# Patient Record
Sex: MALE
[De-identification: names, ages, dates, MRNs, and addresses within clinical notes are randomized per-mention and may not be internally consistent; named-entity substitution may affect disease eponyms.]

---

## 2023-12-19 ENCOUNTER — DASHBOARD ENCOUNTERS (OUTPATIENT)
Age: 58
End: 2023-12-19

## 2023-12-19 ENCOUNTER — LAB OUTSIDE AN ENCOUNTER (OUTPATIENT)
Dept: URBAN - METROPOLITAN AREA CLINIC 63 | Facility: CLINIC | Age: 58
End: 2023-12-19

## 2023-12-19 ENCOUNTER — OFFICE VISIT (OUTPATIENT)
Dept: URBAN - METROPOLITAN AREA CLINIC 63 | Facility: CLINIC | Age: 58
End: 2023-12-19
Payer: COMMERCIAL

## 2023-12-19 VITALS
WEIGHT: 182 LBS | TEMPERATURE: 97.9 F | HEART RATE: 59 BPM | SYSTOLIC BLOOD PRESSURE: 124 MMHG | HEIGHT: 72 IN | DIASTOLIC BLOOD PRESSURE: 78 MMHG | BODY MASS INDEX: 24.65 KG/M2 | OXYGEN SATURATION: 97 %

## 2023-12-19 DIAGNOSIS — Z12.11 SCREEN FOR COLON CANCER: ICD-10-CM

## 2023-12-19 DIAGNOSIS — K70.30 ALCOHOLIC CIRRHOSIS OF LIVER WITHOUT ASCITES: ICD-10-CM

## 2023-12-19 PROBLEM — 420054005: Status: ACTIVE | Noted: 2023-12-19

## 2023-12-19 PROCEDURE — 99204 OFFICE O/P NEW MOD 45 MIN: CPT | Performed by: PHYSICIAN ASSISTANT

## 2023-12-19 RX ORDER — ROSUVASTATIN CALCIUM 10 MG/1
TAKE ONE TABLET BY MOUTH ONE TIME DAILY AT BEDTIME TABLET, COATED ORAL
Qty: 30 UNSPECIFIED | Refills: 0 | Status: ACTIVE | COMMUNITY

## 2023-12-19 RX ORDER — ATENOLOL 50 MG/1
TAKE ONE TABLET BY MOUTH ONE TIME DAILY TABLET ORAL
Qty: 30 UNSPECIFIED | Refills: 0 | Status: ACTIVE | COMMUNITY

## 2023-12-19 NOTE — HPI-TODAY'S VISIT:
57 yo referred for management of cirrhosis. He had labs done through his PCP on 8/24/23 which demonstrated an elevation of his AST and ALT to 62 and48 respectively. ALP normal at 112. T bili 1.3. CBC was unremarkable except for low platelets at 124. He was sent for a fibroscan on 10/27/23 [facility] demonstrated severe steatosis (S3) and severe fibrosis (F3). He used to drink shot and mixed cocktails. Now drinking beer about 3 beers per day. He will usually drink more on the weekends but he does not quantify. He screened negative for HBV and HCV.   He has no family history of liver disease. His father had alcoholism.   He has no Gi complaints. Denies any pyrosis, dysphagia, odynophagia, nausea, vomiting, abdominal pain, constipation, diarrhea, melena, hematochezia.   He had a colonoscopy within the past 10 years to evaluate rectal bleeding. He states this was due to hemorrhoids.   He has no family history of GI malignancy.

## 2023-12-29 ENCOUNTER — OFFICE VISIT (OUTPATIENT)
Dept: URBAN - METROPOLITAN AREA SURGERY CENTER 4 | Facility: SURGERY CENTER | Age: 58
End: 2023-12-29
Payer: COMMERCIAL

## 2023-12-29 ENCOUNTER — CLAIMS CREATED FROM THE CLAIM WINDOW (OUTPATIENT)
Dept: URBAN - METROPOLITAN AREA CLINIC 4 | Facility: CLINIC | Age: 58
End: 2023-12-29
Payer: COMMERCIAL

## 2023-12-29 ENCOUNTER — TELEPHONE ENCOUNTER (OUTPATIENT)
Dept: URBAN - METROPOLITAN AREA CLINIC 63 | Facility: CLINIC | Age: 58
End: 2023-12-29

## 2023-12-29 DIAGNOSIS — K31.89 OTHER DISEASES OF STOMACH AND DUODENUM: ICD-10-CM

## 2023-12-29 DIAGNOSIS — K44.9 HIATAL HERNIA: ICD-10-CM

## 2023-12-29 DIAGNOSIS — K57.30 DIVERTICULA, COLON: ICD-10-CM

## 2023-12-29 DIAGNOSIS — K29.70 GASTRITIS WITHOUT BLEEDING, UNSPECIFIED CHRONICITY, UNSPECIFIED GASTRITIS TYPE: ICD-10-CM

## 2023-12-29 DIAGNOSIS — K64.0 FIRST DEGREE HEMORRHOIDS: ICD-10-CM

## 2023-12-29 DIAGNOSIS — K29.70 GASTRITIS: ICD-10-CM

## 2023-12-29 DIAGNOSIS — K22.70 BARRETT'S ESOPHAGUS WITHOUT DYSPLASIA: ICD-10-CM

## 2023-12-29 DIAGNOSIS — Z12.11 COLON CANCER SCREENING (HIGH RISK): ICD-10-CM

## 2023-12-29 DIAGNOSIS — K57.30 DIVERTICULOSIS OF LARGE INTESTINE WITHOUT PERFORATION OR ABSCESS WITHOUT BLEEDING: ICD-10-CM

## 2023-12-29 DIAGNOSIS — K22.89 OTHER SPECIFIED DISEASE OF ESOPHAGUS: ICD-10-CM

## 2023-12-29 DIAGNOSIS — Z12.11 ENCOUNTER FOR SCREENING FOR MALIGNANT NEOPLASM OF COLON: ICD-10-CM

## 2023-12-29 DIAGNOSIS — K44.9 DIAPHRAGMATIC HERNIA WITHOUT OBSTRUCTION OR GANGRENE: ICD-10-CM

## 2023-12-29 PROCEDURE — 88312 SPECIAL STAINS GROUP 1: CPT | Performed by: PATHOLOGY

## 2023-12-29 PROCEDURE — 43239 EGD BIOPSY SINGLE/MULTIPLE: CPT | Performed by: INTERNAL MEDICINE

## 2023-12-29 PROCEDURE — G0121 COLON CA SCRN NOT HI RSK IND: HCPCS | Performed by: INTERNAL MEDICINE

## 2023-12-29 PROCEDURE — 88305 TISSUE EXAM BY PATHOLOGIST: CPT | Performed by: PATHOLOGY

## 2023-12-29 PROCEDURE — 00813 ANES UPR LWR GI NDSC PX: CPT | Performed by: NURSE ANESTHETIST, CERTIFIED REGISTERED

## 2023-12-29 PROCEDURE — 88313 SPECIAL STAINS GROUP 2: CPT | Performed by: PATHOLOGY

## 2023-12-29 RX ORDER — ATENOLOL 50 MG/1
TAKE ONE TABLET BY MOUTH ONE TIME DAILY TABLET ORAL
Qty: 30 UNSPECIFIED | Refills: 0 | Status: ACTIVE | COMMUNITY

## 2023-12-29 RX ORDER — ROSUVASTATIN CALCIUM 10 MG/1
TAKE ONE TABLET BY MOUTH ONE TIME DAILY AT BEDTIME TABLET, COATED ORAL
Qty: 30 UNSPECIFIED | Refills: 0 | Status: ACTIVE | COMMUNITY

## 2024-01-05 ENCOUNTER — TELEPHONE ENCOUNTER (OUTPATIENT)
Dept: URBAN - METROPOLITAN AREA CLINIC 63 | Facility: CLINIC | Age: 59
End: 2024-01-05

## 2024-01-05 ENCOUNTER — LAB OUTSIDE AN ENCOUNTER (OUTPATIENT)
Dept: URBAN - METROPOLITAN AREA CLINIC 63 | Facility: CLINIC | Age: 59
End: 2024-01-05

## 2024-01-05 PROBLEM — 300331000: Status: ACTIVE | Noted: 2024-01-05

## 2024-01-11 LAB
% SATURATION: 25
A/G RATIO: 1.9
ACTIN (SMOOTH MUSCLE) ANTIBODY (IGG): <20
AFP, SERUM, TUMOR MARKER: 4.7
ALBUMIN: 4.6
ALKALINE PHOSPHATASE: 69
ALPHA-1-ANTITRYPSIN QN: 155
ALT (SGPT): 23
ANA SCREEN, IFA: NEGATIVE
AST (SGOT): 26
BILIRUBIN, TOTAL: 0.6
BUN/CREATININE RATIO: (no result)
BUN: 9
CALCIUM: 9.5
CARBON DIOXIDE, TOTAL: 31
CHLORIDE: 106
CREATININE: 0.83
EGFR: 103
FERRITIN: 217
GLOBULIN, TOTAL: 2.4
GLUCOSE: 98
HEMATOCRIT: 45.4
HEMOGLOBIN: 15.2
INR: 1
IRON BINDING CAPACITY: 319
IRON, TOTAL: 79
MCH: 31.9
MCHC: 33.5
MCV: 95.2
MITOCHONDRIAL (M2) ANTIBODY: <=20
MPV: 12.6
PLATELET COUNT: 129
POTASSIUM: 4.4
PROTEIN, TOTAL: 7
PT: 10.3
RDW: 11.7
RED BLOOD CELL COUNT: 4.77
SODIUM: 143
WHITE BLOOD CELL COUNT: 4.2

## 2024-01-16 ENCOUNTER — TELEPHONE ENCOUNTER (OUTPATIENT)
Dept: URBAN - METROPOLITAN AREA CLINIC 63 | Facility: CLINIC | Age: 59
End: 2024-01-16

## 2025-03-04 ENCOUNTER — OFFICE VISIT (OUTPATIENT)
Dept: URBAN - METROPOLITAN AREA SURGERY CENTER 4 | Facility: SURGERY CENTER | Age: 60
End: 2025-03-04